# Patient Record
Sex: FEMALE | Race: WHITE
[De-identification: names, ages, dates, MRNs, and addresses within clinical notes are randomized per-mention and may not be internally consistent; named-entity substitution may affect disease eponyms.]

---

## 2018-05-01 ENCOUNTER — HOSPITAL ENCOUNTER (OUTPATIENT)
Dept: HOSPITAL 62 - WI | Age: 48
End: 2018-05-01
Attending: FAMILY MEDICINE
Payer: COMMERCIAL

## 2018-05-01 DIAGNOSIS — N63.41: Primary | ICD-10-CM

## 2018-05-01 PROCEDURE — 76642 ULTRASOUND BREAST LIMITED: CPT

## 2018-05-01 PROCEDURE — G0279 TOMOSYNTHESIS, MAMMO: HCPCS

## 2018-05-01 PROCEDURE — 77066 DX MAMMO INCL CAD BI: CPT

## 2018-05-01 PROCEDURE — 77062 BREAST TOMOSYNTHESIS BI: CPT

## 2018-05-03 NOTE — WOMENS IMAGING REPORT
EXAM DESCRIPTION:  3D DX MAMMO BILAT; U/S BREAST UNILAT LIMITED



COMPLETED DATE/TIME:  5/1/2018 10:43 am; 5/1/2018 10:42 am



REASON FOR STUDY:  SUBCUTANEOUS CYST; CYST OF SUBCUTANEOUS TISSUE; L72.9 L72.9  FOLLICULAR CYST OF TH
E SKIN AND SUBCUTANEOUS TISSUE, U



COMPARISON:  Mammograms and ultrasound 8/6/2012



TECHNIQUE:  Standard craniocaudal and mediolateral oblique views of each breast recorded using digita
l acquisition and breast tomosynthesis.

Additional right breast diagnostic mammogram and tomosynthesis

Right breast ultrasound was also performed.



LIMITATIONS:  None.



FINDINGS:  RIGHT BREAST

MASSES:  2 cm smooth well circumscribed mass right retroareolar breast with pleomorphic calcification
s.  This is solid at ultrasound, with internal color flow likely a papilloma or fibroadenoma.  Howeve
r, this is larger than in 2012 with increasing calcifications, and atypia or malignant change cannot 
be excluded.  Ultrasound guided biopsy with post biopsy clip placement and follow up two view mammogr
am recommended for follow up.

CALCIFICATIONS: No new or suspicious calcifications.

ARCHITECTURAL DISTORTION: None.

DEVELOPING DENSITY: None.

ASYMMETRY: None noted.

OTHER: No other significant findings.

LEFT BREAST

MASSES: No suspicious masses.

CALCIFICATIONS: No new or suspicious calcifications.

ARCHITECTURAL DISTORTION: None.

DEVELOPING DENSITY: None.

ASYMMETRY: None noted.

OTHER: No other significant finding.

Read with the assistance of CAD:

.[facility] - R2 Cenova Version 1.3

.[facility] - R2 Cenova Version 1.3

.\[facility]\"" [facility] - R2 Cenova Version 2.4

.[facility] - R2 Cenova Version 2.4

.[facility] - R2  Version 9.2

Right breast ultrasound:

At solid well circumscribed right retroareolar nodule is present, 2.3 x 1 cm in size.  There is inter
nal color flow.  Calcifications are seen at ultrasound.  This is likely a papilloma or fibroadenoma. 
 However, this is larger than in 2012 with increasing calcifications, and atypia or malignant change 
cannot be excluded.  Ultrasound guided biopsy right retroareolar nodule with post biopsy clip placeme
nt and follow up two view mammogram recommended for follow up



IMPRESSION:  Right breast solid retroareolar nodule, for which ultrasound guided biopsy with post bio
psy clip placement and follow up two view mammogram recommended for follow up

No mammographic/tomosynthesis evidence for malignancy, left breast



BREAST DENSITY:  b. There are scattered areas of fibroglandular density.



BIRAD:  4 Suspicious. Biopsy should be considered.



RECOMMENDATION:  RECOMMENDED FOLLOW UP: Ultrasound guided biopsy right retroareolar nodule with post 
biopsy clip placement and follow up two view mammogram recommended for follow up

SPECIFIC INTERVENTION/IMAGING/CONSULTATION RECOMMENDED:Ultrasound guided biopsy right retroareolar no
dule with post biopsy clip placement and follow up two view mammogram recommended for follow up

COMMUNICATION:Patient notified by letter.



COMMENT:  The patient has been notified of the results by letter per SA requirements. Additional no
tification policies are in place for contacting patient with suspicious or incomplete findings.

Quality ID #225: The American College of Radiology recommends an annual screening mammogram for women
 aged 40 years or over. This facility utilizes a reminder system to ensure that all patients receive 
reminder letters, and/or direct phone calls for appointments. This includes reminders for routine scr
eening mammograms, diagnostic mammograms, or other Breast Imaging Interventions when appropriate.  Th
is patient will be placed in the appropriate reminder system.

The American College of Radiology (ACR) has developed recommendations for screening MRI of the breast
s in certain patient populations, to be used in conjunction with mammography.  Breast MRI surveillanc
e may be appropriate for women with more than 20% lifetime risk of developing breast cancer  as deter
mined by genetic testing, significant family history of the disease, or history of mantle radiation f
or Hodgkins Disease.  ACR Practice Guidelines 2008.

[employer]



PQRS 6045F:  Fluoroscopic imaging is not utilized for breast tomosynthesis.



TECHNICAL DOCUMENTATION:  FINDING NUMBER: (1)

ASSESSMENT: (1)

JOB ID:  [id number]

 2011 [employer]- All Rights Reserved



Reading location - IP/workstation name: [id number] [id number]

## 2018-05-03 NOTE — WOMENS IMAGING REPORT
EXAM DESCRIPTION:  3D DX MAMMO BILAT; U/S BREAST UNILAT LIMITED



COMPLETED DATE/TIME:  5/1/2018 10:43 am; 5/1/2018 10:42 am



REASON FOR STUDY:  SUBCUTANEOUS CYST; CYST OF SUBCUTANEOUS TISSUE; L72.9 L72.9  FOLLICULAR CYST OF TH
E SKIN AND SUBCUTANEOUS TISSUE, U



COMPARISON:  Mammograms and ultrasound 8/6/2012



TECHNIQUE:  Standard craniocaudal and mediolateral oblique views of each breast recorded using digita
l acquisition and breast tomosynthesis.

Additional right breast diagnostic mammogram and tomosynthesis

Right breast ultrasound was also performed.



LIMITATIONS:  None.



FINDINGS:  RIGHT BREAST

MASSES:  2 cm smooth well circumscribed mass right retroareolar breast with pleomorphic calcification
s.  This is solid at ultrasound, with internal color flow likely a papilloma or fibroadenoma.  Howeve
r, this is larger than in 2012 with increasing calcifications, and atypia or malignant change cannot 
be excluded.  Ultrasound guided biopsy with post biopsy clip placement and follow up two view mammogr
am recommended for follow up.

CALCIFICATIONS: No new or suspicious calcifications.

ARCHITECTURAL DISTORTION: None.

DEVELOPING DENSITY: None.

ASYMMETRY: None noted.

OTHER: No other significant findings.

LEFT BREAST

MASSES: No suspicious masses.

CALCIFICATIONS: No new or suspicious calcifications.

ARCHITECTURAL DISTORTION: None.

DEVELOPING DENSITY: None.

ASYMMETRY: None noted.

OTHER: No other significant finding.

Read with the assistance of CAD:

.[facility] - R2 Cenova Version 1.3

.[facility] - R2 Cenova Version 1.3

.[facility] - R2 Cenova Version 2.4

.[facility] - R2 Cenova Version 2.4

.[facility] - R2  Version 9.2

Right breast ultrasound:

At solid well circumscribed right retroareolar nodule is present, 2.3 x 1 cm in size.  There is inter
nal color flow.  Calcifications are seen at ultrasound.  This is likely a papilloma or fibroadenoma. 
 However, this is larger than in 2012 with increasing calcifications, and atypia or malignant change 
cannot be excluded.  Ultrasound guided biopsy right retroareolar nodule with post biopsy clip placeme
nt and follow up two view mammogram recommended for follow up



IMPRESSION:  Right breast solid retroareolar nodule, for which ultrasound guided biopsy with post bio
psy clip placement and follow up two view mammogram recommended for follow up

No mammographic/tomosynthesis evidence for malignancy, left breast



BREAST DENSITY:  b. There are scattered areas of fibroglandular density.



BIRAD:  4 Suspicious. Biopsy should be considered.



RECOMMENDATION:  RECOMMENDED FOLLOW UP: Ultrasound guided biopsy right retroareolar nodule with post 
biopsy clip placement and follow up two view mammogram recommended for follow up

SPECIFIC INTERVENTION/IMAGING/CONSULTATION RECOMMENDED:Ultrasound guided biopsy right retroareolar no
dule with post biopsy clip placement and follow up two view mammogram recommended for follow up

COMMUNICATION:Patient notified by letter.



COMMENT:  The patient has been notified of the results by letter per SA requirements. Additional no
tification policies are in place for contacting patient with suspicious or incomplete findings.

Quality ID #225: The American College of Radiology recommends an annual screening mammogram for women
 aged 40 years or over. This facility utilizes a reminder system to ensure that all patients receive 
reminder letters, and/or direct phone calls for appointments. This includes reminders for routine scr
eening mammograms, diagnostic mammograms, or other Breast Imaging Interventions when appropriate.  Th
is patient will be placed in the appropriate reminder system.

The American College of Radiology (ACR) has developed recommendations for screening MRI of the breast
s in certain patient populations, to be used in conjunction with mammography.  Breast MRI surveillanc
e may be appropriate for women with more than 20% lifetime risk of developing breast cancer  as deter
mined by genetic testing, significant family history of the disease, or history of mantle radiation f
or Hodgkins Disease.  ACR Practice Guidelines 2008.

[employer]



PQRS 6045F:  Fluoroscopic imaging is not utilized for breast tomosynthesis.



TECHNICAL DOCUMENTATION:  FINDING NUMBER: (1)

ASSESSMENT: (1)

JOB ID:  [id number]

 2011 [employer]- All Rights Reserved



Reading location - IP/workstation name: [id number] [id number] On full comfort measures

## 2018-05-07 ENCOUNTER — HOSPITAL ENCOUNTER (OUTPATIENT)
Dept: HOSPITAL 62 - WI | Age: 48
End: 2018-05-07
Attending: FAMILY MEDICINE
Payer: COMMERCIAL

## 2018-05-07 DIAGNOSIS — N60.01: Primary | ICD-10-CM

## 2018-05-07 PROCEDURE — 88307 TISSUE EXAM BY PATHOLOGIST: CPT

## 2018-05-07 PROCEDURE — 19083 BX BREAST 1ST LESION US IMAG: CPT
